# Patient Record
(demographics unavailable — no encounter records)

---

## 2025-04-23 NOTE — ASSESSMENT
[FreeTextEntry1] : Hina is a 5 year old, ex-FT, right-handed, female who presents to Neurology for their initial visit with concerns of childhood absence epilepsy. Patient is non-focal on neurological exam today without any obvious neurocutaneous stigmata, and meeting developmental milestones which is reassuring. Called EEG lab to arrange same day REEG and counseled patient will likely have a behavioral arrest with HV. 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 Discussed starting Zarontin/Ethosuximide with parents in office and again over the phone after REEG. Ethosuximide is used for absence seizures only. If any other seizure type develops parents are to inform office so different ASM can be used. It is generally well-tolerated. The liquid formulation has been associated with abdominal pain; this is rarely seen with the capsule formulation. Both should be taken with food to prevent this side effect. The other rare side effect is sleepiness. This medication is dosed by child's weight. Blood levels are usually not needed while on this medication. Start Ethosuximide titration as follows 2.5 mL (125 mg) BID for 1 week then increase to 2.5 mL qAM/3 mL qPM for 1 week then increase to 3 mL BID for 1 week then increase to 3 mL qAM/3.5 mL qPM for 1 week then increase to 3.5 mL BID for 1 week then increase to 3.5 mL qAM/4 mL qPM for 1 week then 4 mL BID. Sent to pharmacy along with rescue Diastat 10 mg PRN seizures >3 minutes or cluster seizures (>1 in 24 hours). Discussed that Diastat (rectal diazepam) is a medication that is used to stop a seizure that lasts longer than 3 minutes or a cluster of seizures.  It is an emergency medication and is not taken every day to prevent seizures.  It is administered in the rectum when needed. It comes is a pre-filled syringe, so you do not need to measure anything. If you need to use this medication, you should put the lubricating jelly supplied with the medication on the tip, insert the tip into your child's rectum, and press down the plunger.  Then, take the syringe out and hold the butt cheeks together for a count of 10 seconds. Some medication may seep out but that is ok. It will take another minute or so for the medication to work. The major side effect is sleepiness. Extensive counseling and written materials provided on seizure safety, SUDEP, prognosis, daily preventative anti-seizure medication for at least 1-2 years before repeating EEG for consideration of weaning ASMs, emergency abortive medication, ED/911 usage, obtain MRI Brain with epilepsy protocol, Ophthalmology for dilated fundoscopic exam, obtain lab work including vitamin D, pros/cons of [facility] genetic testing as well as referral to Genetics. Addressed all parents' questions regarding ADHD as a DDx and can screen with [place] if inattentive persists after  treatment. Wrote school letter. Follow up in 1 month, or sooner if symptoms worsen.  I spent a total of 120 minutes on the date of the encounter chart reviewing, evaluating, coordination of care, counseling, and treating the patient.

## 2025-04-23 NOTE — PLAN
[FreeTextEntry1] : - Called EEG lab for same day REEG; reviewed REEG 3 Hz SW most consistent with  (childhood absence epilepsy) - Start Zarontin/Ethosuximide 2.5 mL (125 mg) BID for 1 week then increase to 2.5 mL qAM/3 mL qPM for 1 week then increase to 3 mL BID for 1 week then increase to 3 mL qAM/3.5 mL qPM for 1 week then increase to 3.5 mL BID for 1 week then increase to 3.5 mL qAM/4 mL qPM for 1 week then 4 mL BID. Sent to pharmacy along with rescue Diastat 10 mg PRN seizures >3 minutes or cluster seizures (>1 in 24 hours) - Counseled on Diastat usage as well as seizure safety precautions - Obtain MRI Brain with epilepsy protocol - Discussed pros/cons of [facility] genetic testing as well as referral to Genetics.  - Ophthalmology for dilated fundoscopic exam - Obtain lab work including vitamin D - Will screen with [place] if inattentive persists after  treatment. - Wrote school letter - Follow up in 1 month, or sooner if symptoms worsen.

## 2025-04-23 NOTE — CONSULT LETTER
[Dear  ___] : Dear  [unfilled], [Please see my note below.] : Please see my note below. [Sincerely,] : Sincerely, [FreeTextEntry3] : Federica Wolff,  Attending Child Neurologist/Epileptologist

## 2025-04-23 NOTE — QUALITY MEASURES
[Seizure frequency] : Seizure frequency: Yes [Etiology, seizure type, and epilepsy syndrome] : Etiology, seizure type, and epilepsy syndrome: Yes [Side effects of anti-seizure medications] : Side effects of anti-seizure medications: Yes [Safety and education around seizures] : Safety and education around seizures: Yes [Sudden unexpected death in epilepsy (SUDEP)] : Sudden unexpected death in epilepsy: Yes [Screening for anxiety, depression] : Screening for anxiety, depression: Yes [Issues around driving] : Issues around driving: Not Applicable [Treatment-resistant epilepsy (every visit)] : Treatment-resistant epilepsy (every visit): Not Applicable 83.5 [Adherence to medication(s)] : Adherence to medication(s): Not Applicable [Counseling for women of childbearing potential with epilepsy (including folic acid supplement)] : Counseling for women of childbearing potential with epilepsy (including folic acid supplement): Not Applicable [Options for adjunctive therapy (Neurostimulation, CBD, Dietary Therapy, Epilepsy Surgery)] : Options for adjunctive therapy (Neurostimulation, CBD, Dietary Therapy, Epilepsy Surgery): Not Applicable [25 Hydroxy Vitamin D level assessed and Vitamin D3 ordered] : 25 Hydroxy Vitamin D level assessed and Vitamin D3 ordered: Not Applicable [Thyroid profile ordered] : Thyroid profile ordered: Not Applicable

## 2025-04-23 NOTE — QUALITY MEASURES
[Seizure frequency] : Seizure frequency: Yes [Etiology, seizure type, and epilepsy syndrome] : Etiology, seizure type, and epilepsy syndrome: Yes [Side effects of anti-seizure medications] : Side effects of anti-seizure medications: Yes [Safety and education around seizures] : Safety and education around seizures: Yes [Sudden unexpected death in epilepsy (SUDEP)] : Sudden unexpected death in epilepsy: Yes [Screening for anxiety, depression] : Screening for anxiety, depression: Yes [Issues around driving] : Issues around driving: Not Applicable [Treatment-resistant epilepsy (every visit)] : Treatment-resistant epilepsy (every visit): Not Applicable [Adherence to medication(s)] : Adherence to medication(s): Not Applicable [Counseling for women of childbearing potential with epilepsy (including folic acid supplement)] : Counseling for women of childbearing potential with epilepsy (including folic acid supplement): Not Applicable [Options for adjunctive therapy (Neurostimulation, CBD, Dietary Therapy, Epilepsy Surgery)] : Options for adjunctive therapy (Neurostimulation, CBD, Dietary Therapy, Epilepsy Surgery): Not Applicable [25 Hydroxy Vitamin D level assessed and Vitamin D3 ordered] : 25 Hydroxy Vitamin D level assessed and Vitamin D3 ordered: Not Applicable [Thyroid profile ordered] : Thyroid profile ordered: Not Applicable

## 2025-04-23 NOTE — ASSESSMENT
[FreeTextEntry1] : Hina is a 5 year old, ex-FT, right-handed, female who presents to Neurology for their initial visit with concerns of childhood absence epilepsy. Patient is non-focal on neurological exam today without any obvious neurocutaneous stigmata, and meeting developmental milestones which is reassuring. Called EEG lab to arrange same day REEG and counseled patient will likely have a behavioral arrest with HV. 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 Discussed starting Zarontin/Ethosuximide with parents in office and again over the phone after REEG. Ethosuximide is used for absence seizures only. If any other seizure type develops parents are to inform office so different ASM can be used. It is generally well-tolerated. The liquid formulation has been associated with abdominal pain; this is rarely seen with the capsule formulation. Both should be taken with food to prevent this side effect. The other rare side effect is sleepiness. This medication is dosed by child's weight. Blood levels are usually not needed while on this medication. Start Ethosuximide titration as follows 2.5 mL (125 mg) BID for 1 week then increase to 2.5 mL qAM/3 mL qPM for 1 week then increase to 3 mL BID for 1 week then increase to 3 mL qAM/3.5 mL qPM for 1 week then increase to 3.5 mL BID for 1 week then increase to 3.5 mL qAM/4 mL qPM for 1 week then 4 mL BID. Sent to pharmacy along with rescue Diastat 10 mg PRN seizures >3 minutes or cluster seizures (>1 in 24 hours). Discussed that Diastat (rectal diazepam) is a medication that is used to stop a seizure that lasts longer than 3 minutes or a cluster of seizures.  It is an emergency medication and is not taken every day to prevent seizures.  It is administered in the rectum when needed. It comes is a pre-filled syringe, so you do not need to measure anything. If you need to use this medication, you should put the lubricating jelly supplied with the medication on the tip, insert the tip into your child's rectum, and press down the plunger.  Then, take the syringe out and hold the butt cheeks together for a count of 10 seconds. Some medication may seep out but that is ok. It will take another minute or so for the medication to work. The major side effect is sleepiness. Extensive counseling and written materials provided on seizure safety, SUDEP, prognosis, daily preventative anti-seizure medication for at least 1-2 years before repeating EEG for consideration of weaning ASMs, emergency abortive medication, ED/911 usage, obtain MRI Brain with epilepsy protocol, Ophthalmology for dilated fundoscopic exam, obtain lab work including vitamin D, pros/cons of [facility] genetic testing as well as referral to Genetics. Addressed all parents' questions regarding ADHD as a DDx and can screen with [place] if inattentive persists after  treatment. Wrote school letter. Follow up in 1 month, or sooner if symptoms worsen.  I spent a total of 120 minutes on the date of the encounter chart reviewing, evaluating, coordination of care, counseling, and treating the patient.

## 2025-04-23 NOTE — DEVELOPMENTAL MILESTONES
[Prepares cereal] : prepares cereal [Brushes teeth, no help] : brushes teeth, no help [Plays board/card games] : plays board/card games [Able to tie knot] : able to tie knot [Mature pencil grasp] : mature pencil grasp [Draws person with 6 parts] : draws person with 6 parts [Prints some letters and numbers] : prints some letters and numbers [Copies square and triangle] : copies square and triangle [Balances on one foot 5-6 seconds] : balances on one foot 5-6 seconds [Heel-to-toe walk] : heel to toe walk [Good articulation and language skills] : good articulation and language skills [Counts to 10] : counts to 10 [Names 4+ colors] : names 4+ colors [Follows simple directions] : follows simple directions [Listens and attends] : listens and attends [Knows 2 opposites] : knows 2 opposites

## 2025-04-23 NOTE — HISTORY OF PRESENT ILLNESS
[FreeTextEntry1] : Hina is a 5 year old, ex-FT, right-handed, female who presents to Neurology for their initial visit with concerns of staring spells/seizure. Accompanied by parents today.  Parents report that they first noticing staring episodes started around patient's birthday 3 weeks ago. Patient is happy as went to [place].  The staring spells lasting about 30 seconds and seem to occur daily, up to 3-4x/day, without any known triggers. Mother states that the episodes are characterized by staring and unresponsiveness with often no response to touch or voice. Denies eye fluttering, mouth twitching, stiffening, convulsion, incontinence, tongue biting, foaming at the mouth, apnea, cyanosis, choking, post-ictal Alphonso's. Takes 30 seconds to return back to baseline.   Denies lack of sleep, stress, recent sickness, new medications, supplements.  Patient sleeps in the same room as parents.   Denies any developmental regression/stagnation, previous meningitis, head trauma/concussion, neurosurgical procedure, autism, developmental delays, myoclonus, convulsions, waking up with blood on pillow, unexplained myalgias, febrile seizures, history of previous post-ictal Alphonso's, status epilepticus or seizure clusters, family history of seizures, autism, or developmental delays. Semiology of Events: staring episodes likely absence Duration: <30 seconds Current Total Seizure Frequency: 3-4x/day   Birth History: full-term, denies NICU stays or complications, met developmental milestones, denies birthmarks Developmental History: no concerns, denies PT, OT, Speech, or JAMIL therapy Past Medical History: denies Past Surgical History: denies  Medications: denies Allergies: denies Social History: Lives at home with parents, pets (lizard, fish, dog); no siblings School: Currently in pre- grade, doing well, denies IEP or 504   Denies fever, chill, URI symptoms, emesis, diarrhea, rash, sick contacts.  Family History: Denies family history of ADHD, seizures, developmental delays, autism, headaches, or other neurological disorders.  Per chart review, seen in ED on 8/2022 2y4m Female complaining of abdominal pain. 2y4m F no PMHx p/w with loose stools for 9 days. Her stomach also was hurting tonight so she was brought in. Mom states they returned from [facility] 2 weeks ago and she and her daughter have had diarrhea. Daughter has 1-2 stools per day which are very loose and strange smelling. Also notes diffuse rash and itching intermittently since yesterday, no new foods, dyes, perfumes, etc. Pt feeding normally and making 6 wet diapers per day. 1 yo female with loose stools for 9 days after return from travel and mom having diarrhe as well, no fevers, no vomiting, no blood in stools, seems to be having more abdominal pain today, Having about one loose yellow stool per day, normal urine output. Developed pruritic rash for one day. no cough, no difficulty breathing. blanching maculopapular rash on face, chest abdomen, no petechiae, no purpura, no vesicles, lungs clear, no lip swelling, tm;s clear, pharynx negative, impression: diarrhea, needs stool samples to be sent, US to r/o, intussusception, follow up with pmd

## 2025-04-23 NOTE — PLAN
[FreeTextEntry1] : - Called EEG lab for same day REEG; reviewed REEG 3 Hz SW most consistent with  (childhood absence epilepsy) - Start Zarontin/Ethosuximide 2.5 mL (125 mg) BID for 1 week then increase to 2.5 mL qAM/3 mL qPM for 1 week then increase to 3 mL BID for 1 week then increase to 3 mL qAM/3.5 mL qPM for 1 week then increase to 3.5 mL BID for 1 week then increase to 3.5 mL qAM/4 mL qPM for 1 week then 4 mL BID. Sent to pharmacy along with rescue Diastat 10 mg PRN seizures >3 minutes or cluster seizures (>1 in 24 hours) - Counseled on Diastat usage as well as seizure safety precautions - Obtain MRI Brain with epilepsy protocol - Discussed pros/cons of [facility] genetic testing as well as referral to Genetics.  - Ophthalmology for dilated fundoscopic exam - Obtain lab work including vitamin D - Will screen with [place] if inattentive persists after  treatment. - Wrote school letter - Follow up in 1 month, or sooner if symptoms worsen.

## 2025-04-23 NOTE — HISTORY OF PRESENT ILLNESS
[FreeTextEntry1] : Hina is a 5 year old, ex-FT, right-handed, female who presents to Neurology for their initial visit with concerns of staring spells/seizure. Accompanied by parents today.  Parents report that they first noticing staring episodes started around patient's birthday 3 weeks ago. Patient is happy as went to [place].  The staring spells lasting about 30 seconds and seem to occur daily, up to 3-4x/day, without any known triggers. Mother states that the episodes are characterized by staring and unresponsiveness with often no response to touch or voice. Denies eye fluttering, mouth twitching, stiffening, convulsion, incontinence, tongue biting, foaming at the mouth, apnea, cyanosis, choking, post-ictal Alphonso's. Takes 30 seconds to return back to baseline.   Denies lack of sleep, stress, recent sickness, new medications, supplements.  Patient sleeps in the same room as parents.   Denies any developmental regression/stagnation, previous meningitis, head trauma/concussion, neurosurgical procedure, autism, developmental delays, myoclonus, convulsions, waking up with blood on pillow, unexplained myalgias, febrile seizures, history of previous post-ictal Alphonso's, status epilepticus or seizure clusters, family history of seizures, autism, or developmental delays. Semiology of Events: staring episodes likely absence Duration: <30 seconds Current Total Seizure Frequency: 3-4x/day   Birth History: full-term, denies NICU stays or complications, met developmental milestones, denies birthmarks Developmental History: no concerns, denies PT, OT, Speech, or JAMIL therapy Past Medical History: denies Past Surgical History: denies  Medications: denies Allergies: denies Social History: Lives at home with parents, pets (lizard, fish, dog); no siblings School: Currently in pre- grade, doing well, denies IEP or 504   Denies fever, chill, URI symptoms, emesis, diarrhea, rash, sick contacts.  Family History: Denies family history of ADHD, seizures, developmental delays, autism, headaches, or other neurological disorders.  Per chart review, seen in ED on 8/2022 2y4m Female complaining of abdominal pain. 2y4m F no PMHx p/w with loose stools for 9 days. Her stomach also was hurting tonight so she was brought in. Mom states they returned from [facility] 2 weeks ago and she and her daughter have had diarrhea. Daughter has 1-2 stools per day which are very loose and strange smelling. Also notes diffuse rash and itching intermittently since yesterday, no new foods, dyes, perfumes, etc. Pt feeding normally and making 6 wet diapers per day. 3 yo female with loose stools for 9 days after return from travel and mom having diarrhe as well, no fevers, no vomiting, no blood in stools, seems to be having more abdominal pain today, Having about one loose yellow stool per day, normal urine output. Developed pruritic rash for one day. no cough, no difficulty breathing. blanching maculopapular rash on face, chest abdomen, no petechiae, no purpura, no vesicles, lungs clear, no lip swelling, tm;s clear, pharynx negative, impression: diarrhea, needs stool samples to be sent, US to r/o, intussusception, follow up with pmd

## 2025-05-21 NOTE — PHYSICAL EXAM
[Well-appearing] : well-appearing [Normocephalic] : normocephalic [No dysmorphic facial features] : no dysmorphic facial features [No ocular abnormalities] : no ocular abnormalities [Lungs clear] : lungs clear [Heart sounds regular in rate and rhythm] : heart sounds regular in rate and rhythm [Soft] : soft [No abnormal neurocutaneous stigmata or skin lesions] : no abnormal neurocutaneous stigmata or skin lesions [No deformities] : no deformities [Alert] : alert [Well related, good eye contact] : well related, good eye contact [Conversant] : conversant [Normal speech and language] : normal speech and language [Follows instructions well] : follows instructions well [Pupils reactive to light and accommodation] : pupils reactive to light and accommodation [Full extraocular movements] : full extraocular movements [Saccadic and smooth pursuits intact] : saccadic and smooth pursuits intact [No nystagmus] : no nystagmus [Normal facial sensation to light touch] : normal facial sensation to light touch [No facial asymmetry or weakness] : no facial asymmetry or weakness [Gross hearing intact] : gross hearing intact [Equal palate elevation] : equal palate elevation [Good shoulder shrug] : good shoulder shrug [Normal tongue movement] : normal tongue movement [Midline tongue, no fasciculations] : midline tongue, no fasciculations [R handed] : R handed [Normal axial and appendicular muscle tone] : normal axial and appendicular muscle tone [Gets up on table without difficulty] : gets up on table without difficulty [No pronator drift] : no pronator drift [Normal finger tapping and fine finger movements] : normal finger tapping and fine finger movements [No abnormal involuntary movements] : no abnormal involuntary movements [5/5 strength in proximal and distal muscles of arms and legs] : 5/5 strength in proximal and distal muscles of arms and legs [Walks and runs well] : walks and runs well [Able to do deep knee bend] : able to do deep knee bend [Able to walk on heels] : able to walk on heels [Able to walk on toes] : able to walk on toes [2+ biceps] : 2+ biceps [Triceps] : triceps [Knee jerks] : knee jerks [Ankle jerks] : ankle jerks [No ankle clonus] : no ankle clonus [Bilaterally] : bilaterally [No dysmetria on FTNT] : no dysmetria on FTNT [Good walking balance] : good walking balance [Normal gait] : normal gait [Able to tandem well] : able to tandem well [Negative Romberg] : negative Romberg [de-identified] : Localized to LT and vibration

## 2025-05-21 NOTE — DATA REVIEWED
[FreeTextEntry1] : GABRIEL on 4/23/2025 reviewed at visit with grandmother who reports did not see any staring spells

## 2025-05-21 NOTE — PLAN
[FreeTextEntry1] : - Continue Zarontin/Ethosuximide 5 mL (250 mg) BID (~25 mg/kg/day)  - Rescue Diastat 10 mg PRN seizures >3 minutes or cluster seizures (>1 in 24 hours) - Counseled on Diastat usage as well as seizure safety precautions - Will transition to [employer] once she starts school, mother practiced with [employer]  in the office today - Discussed pros/cons of [facility] genetic testing - deferred as patient ate but will obtain at next visit - Referred Genetics - pending appointment - Ophthalmology for dilated fundoscopic exam - Obtain lab work including vitamin D at next PCP blood draw - Will screen with [place] if inattentive persists after  treatment. - Wrote school letter - Follow up in 2-3 month, or sooner if symptoms worsen.

## 2025-05-21 NOTE — HISTORY OF PRESENT ILLNESS
[FreeTextEntry1] : Hina is a 5 year old, ex-FT, right-handed, female who presents to Neurology for their follow up visit with concerns of staring spells/seizure found to have 3 Hz SW most consistent with  (childhood absence epilepsy) on REEG which was independently reviewed. Last seen on 2025. Accompanied by mother and grandmother today.  MRI Brain (25): No acute intracranial abnormality or seizure nidus is noted. Extensive mucosal thickening involves the maxillary sinuses with bilateral air-fluid levels. Extensive mucosal thickening involves the ethmoid sinus and right sphenoid sinus. Correlate for possible acute sinusitis or allergies. Paranasal sinus opacification as described suggesting sinusitis and/or allergies.  Patient sleeps in the same room as mother. Family plans going to [place] and [facility] for the summer. Mood is good.  REEG on 2025 reviewed at visit in office with grandmother who reports did not see any staring spells and was curious to see.  Per mother has 2 rescue Diastat 10 mg PRN seizures >3 minutes or cluster seizures (>1 in 24 hours), one of home and one for school. She reports the school medication forms were never received but in [employer] was scanned and faxed to school on 25. Per mother patient is currently on Zarontin/Ethosuximide 5 mL (250 mg) BID (~25 mg/kg/day). Denies non-compliance, side effects, since last visit no new medications, new medical problems, ED visits, hospitalization, or recent illnesses. Mother admits patient is congested currently but otherwise denies fever, chill, difficulty breathing, emesis, diarrhea, rash, sick contacts, meningitis, head trauma/concussion, neurosurgical procedure, autism, developmental delays, myoclonus, convulsions, stiffening, loss of tone, waking up with blood on pillow, unexplained myalgias, febrile seizures, history of previous post-ictal Alphonso's, status epilepticus or seizure clusters, family history of seizures (although father was reporting sleep myoclonus), autism, or developmental delays.  Mother states since last visit, she has seen a drastic reduction in length and frequent of the staring spells now lasting about 15 seconds and seems to occur 2-3x/week, up to 2x/day, without any known triggers. Grandmother denies noticing any since start medications.  - Discussed pros/cons of [facility] genetic testing via buccal swab but as patient ate right before the visit deferred until next visit. Mother verbalized agreement and understanding of the plan  - Refered Genetics - pending appointment - Ophthalmology for dilated fundoscopic exam - pending appointment - Lab work (vitamin D) - pending PCP blood draw   Initial History (25): Parents report that they first noticing staring episodes started around patient's birthday 3 weeks ago. Patient is happy as went to [place].  The staring spells lasting about 30 seconds and seem to occur daily, up to 3-4x/day, without any known triggers. Mother states that the episodes are characterized by staring and unresponsiveness with often no response to touch or voice. Denies eye fluttering, mouth twitching, stiffening, convulsion, incontinence, tongue biting, foaming at the mouth, apnea, cyanosis, choking, post-ictal Alphonso's. Takes 30 seconds to return back to baseline.   Denies lack of sleep, stress, recent sickness, new medications, supplements.  Patient sleeps in the same room as parents.   Denies any developmental regression/stagnation, previous meningitis, head trauma/concussion, neurosurgical procedure, autism, developmental delays, myoclonus, convulsions, waking up with blood on pillow, unexplained myalgias, febrile seizures, history of previous post-ictal Alphonso's, status epilepticus or seizure clusters, family history of seizures, autism, or developmental delays. Semiology of Events: staring episodes likely absence Duration: <30 seconds Current Total Seizure Frequency: 3-4x/day   Birth History: full-term, denies NICU stays or complications, met developmental milestones, denies birthmarks Developmental History: no concerns, denies PT, OT, Speech, or JAMIL therapy Past Medical History: denies Past Surgical History: denies  Medications: denies Allergies: denies Social History: Lives at home with parents, pets (lizard, fish, dog); no siblings School: Currently in pre- grade, doing well, denies IEP or 504   Denies fever, chill, URI symptoms, emesis, diarrhea, rash, sick contacts.  Family History: Father has sleep myoclonus Denies family history of ADHD, seizures, developmental delays, autism, headaches, or other neurological disorders.  Per chart review, seen in ED on 2022 2y4m Female complaining of abdominal pain. 2y4m F no PMHx p/w with loose stools for 9 days. Her stomach also was hurting tonight so she was brought in. Mom states they returned from [facility] 2 weeks ago and she and her daughter have had diarrhea. Daughter has 1-2 stools per day which are very loose and strange smelling. Also notes diffuse rash and itching intermittently since yesterday, no new foods, dyes, perfumes, etc. Pt feeding normally and making 6 wet diapers per day. 1 yo female with loose stools for 9 days after return from travel and mom having diarrhe as well, no fevers, no vomiting, no blood in stools, seems to be having more abdominal pain today, Having about one loose yellow stool per day, normal urine output. Developed pruritic rash for one day. no cough, no difficulty breathing. blanching maculopapular rash on face, chest abdomen, no petechiae, no purpura, no vesicles, lungs clear, no lip swelling, tm;s clear, pharynx negative, impression: diarrhea, needs stool samples to be sent, US to r/o, intussusception, follow up with pmd

## 2025-05-21 NOTE — ASSESSMENT
[FreeTextEntry1] : Hina is a 5 year old, ex-FT, right-handed, female who presents to Neurology for their follow up visit with concerns of staring spells/seizure found to have 3 Hz SW most consistent with  (childhood absence epilepsy) on REEG which was independently reviewed, now on Zarontin/Ethosuximide 5 mL (250 mg) BID (~25 mg/kg/day) without non-compliance or side effects. Last seen on 2025, mother reports a drastic reduction in length and frequent of the staring spells. MRI Brain (25) without acute intracranial abnormality or seizure nidus is noted. Extensive mucosal thickening correlate for possible acute sinusitis or allergies. Counseled to bring patient to ENT/PCP for evaluation. REEG reviewed at visit in office with mother, patient, and grandmother who reports did not see any staring spells at all. Patient is non-focal on neurological exam today without any obvious neurocutaneous stigmata, and meeting developmental milestones which is reassuring. If any other seizure type develops parents are to inform office so different ASM can be used. Continue current Ethosuximide for now and mother states 3 refills left so not sent to pharmacy. Reviewed Diastat 10 mg PRN seizures >3 minutes or cluster seizures (>1 in 24 hours) use and administration also discussed switching patient to [employer] after she starts school. Mother practiced with [facility]  in the office today. Extensive counseling and written materials provided on seizure safety, SUDEP, prognosis, daily preventative anti-seizure medication for at least 1-2 years before repeating EEG for consideration of weaning ASMs, emergency abortive medication, ED/911 usage, obtain MRI Brain with epilepsy protocol, Ophthalmology for dilated fundoscopic exam, obtain lab work including vitamin D, pros/cons of [facility] genetic testing (via buccal swab but as patient ate right before the visit deferred until next visit; mother verbalized agreement and understanding of making sure patient does not ate 1 hour before visit next time) as well as referral to Genetics. Addressed all mother and grandmother's questions and concerns. Can screen with [facility] if inattentive persists after  treatment. Wrote school letter. Will repeat REEG same day as follow up in 2-3 months to ensure decreased interictal seizures. Follow up in 2-3 month, or sooner if symptoms worsen.  I spent a total of 55 minutes on the date of the encounter chart reviewing, evaluating, coordination of care, counseling, and treating the patient.

## 2025-05-21 NOTE — REASON FOR VISIT
[Follow-Up Evaluation] : a follow-up evaluation for [Staring Spells] : staring spells [Patient] : patient [Medical Records] : medical records [Family Member] : family member [Mother] : mother [Other: _____] : [unfilled]